# Patient Record
Sex: FEMALE | Race: WHITE | NOT HISPANIC OR LATINO | URBAN - METROPOLITAN AREA
[De-identification: names, ages, dates, MRNs, and addresses within clinical notes are randomized per-mention and may not be internally consistent; named-entity substitution may affect disease eponyms.]

---

## 2024-07-15 ENCOUNTER — APPOINTMENT (OUTPATIENT)
Dept: RADIOLOGY | Facility: CLINIC | Age: 39
End: 2024-07-15
Payer: COMMERCIAL

## 2024-07-15 ENCOUNTER — OFFICE VISIT (OUTPATIENT)
Dept: URGENT CARE | Facility: CLINIC | Age: 39
End: 2024-07-15
Payer: COMMERCIAL

## 2024-07-15 VITALS
TEMPERATURE: 99.6 F | HEART RATE: 108 BPM | WEIGHT: 173 LBS | OXYGEN SATURATION: 98 % | DIASTOLIC BLOOD PRESSURE: 62 MMHG | SYSTOLIC BLOOD PRESSURE: 101 MMHG | RESPIRATION RATE: 20 BRPM

## 2024-07-15 DIAGNOSIS — R05.1 ACUTE COUGH: ICD-10-CM

## 2024-07-15 DIAGNOSIS — J18.9 PNEUMONIA OF BOTH LOWER LOBES DUE TO INFECTIOUS ORGANISM: Primary | ICD-10-CM

## 2024-07-15 PROCEDURE — 71046 X-RAY EXAM CHEST 2 VIEWS: CPT

## 2024-07-15 PROCEDURE — 99213 OFFICE O/P EST LOW 20 MIN: CPT | Performed by: NURSE PRACTITIONER

## 2024-07-15 PROCEDURE — 87635 SARS-COV-2 COVID-19 AMP PRB: CPT | Performed by: NURSE PRACTITIONER

## 2024-07-15 RX ORDER — TOPIRAMATE 50 MG/1
50 TABLET, FILM COATED ORAL 2 TIMES DAILY
COMMUNITY
Start: 2024-01-23 | End: 2025-01-22

## 2024-07-15 RX ORDER — AMOXICILLIN AND CLAVULANATE POTASSIUM 875; 125 MG/1; MG/1
1 TABLET, FILM COATED ORAL EVERY 12 HOURS SCHEDULED
Qty: 20 TABLET | Refills: 0 | Status: SHIPPED | OUTPATIENT
Start: 2024-07-15 | End: 2024-07-25

## 2024-07-15 RX ORDER — ALBUTEROL SULFATE 90 UG/1
2 AEROSOL, METERED RESPIRATORY (INHALATION) EVERY 6 HOURS PRN
Qty: 8.5 G | Refills: 0 | Status: SHIPPED | OUTPATIENT
Start: 2024-07-15

## 2024-07-15 RX ORDER — PREDNISONE 10 MG/1
TABLET ORAL
Qty: 24 TABLET | Refills: 0 | Status: SHIPPED | OUTPATIENT
Start: 2024-07-15

## 2024-07-15 NOTE — RESULT ENCOUNTER NOTE
Spoke with reading radiologist who called and states that CXR is abnormal.  CXR is abnormal. Pt prescribed augmentin, prednisone and albuterol MDI.  Explained she needs 2 week follow up with PCP and is to call today and make the appointment.  Explained may need f/u CXR vs CT scan.  Strict ED instructions given to pt if symptoms worsen or not improved.  Pt verbalizes understanding of instructions.

## 2024-07-15 NOTE — PROGRESS NOTES
[facility]        NAME: Emerita Ross is a 38 y.o. female  : 1985    MRN: [id number]  DATE: July 15, 2024  TIME: 1:41 PM    Assessment and Plan   Pneumonia of both lower lobes due to infectious organism [J18.9]  1. Pneumonia of both lower lobes due to infectious organism  amoxicillin-clavulanate (AUGMENTIN) 875-125 mg per tablet    predniSONE 10 mg tablet    albuterol (ProAir HFA) 90 mcg/act inhaler      2. Acute cough  COVID Only- Office Collect    XR chest pa & lateral    amoxicillin-clavulanate (AUGMENTIN) 875-125 mg per tablet    predniSONE 10 mg tablet    albuterol (ProAir HFA) 90 mcg/act inhaler            Patient Instructions       Follow up with PCP in 3-5 days.  Proceed to  ER if symptoms worsen.    If tests have been performed at [facility], our office will contact you with results if changes need to be made to the care plan discussed with you at the visit.  You can review your full results on [facility].      You have been prescribed Augmentin antibiotic. Finish all the tablets even when you start to feel better.   You can take a probiotics at the same time with the antibiotics.    You appear to have covid/symptoms.  You have a covid test pending.  You are to download  [employer] for the results in 24-48 hours.   You will be notified if results are +.    You are to take vitamin C, D3,  plain robitussin for cough.  Do not take cough suppressants; you want to take an expectorant.  Sleep on your stomach.  Mask as long as you are ill, feverish or coughing.         See your PCP for follow up in 2-3 days.    Go to the ED if symptoms worsen or are severe.   You are to call your PCP if your results are + to discuss Paxlovid treatment            Chief Complaint     Chief Complaint   Patient presents with    Cough     X one week     Shortness of Breath     X several days     Vomiting     After coughing spells          History of Present Illness       This is a 38 year old female here with  cough, congestion, SOB, fever, body aches, fatigue, chills, vomiting, and sweating that started last Monday. She was at a party two days before symptoms started. Her son also got sick but symptoms resolved. She has not tested for covid. She only took motrin and nyquil with no relief. No other complaints. PMH reviewed.  Denies pregnancy.      Cough  Associated symptoms include shortness of breath. Pertinent negatives include no ear pain, sore throat or wheezing.   Shortness of Breath  Associated symptoms include coughing. Pertinent negatives include no sore throat or wheezing.   Vomiting   Associated symptoms include coughing.       Review of Systems   Review of Systems   Constitutional: Negative.    HENT:  Positive for congestion. Negative for ear discharge, ear pain, sinus pressure, sinus pain, sneezing and sore throat.    Eyes: Negative.    Respiratory:  Positive for cough and shortness of breath. Negative for chest tightness and wheezing.    Cardiovascular: Negative.    Gastrointestinal:  Positive for vomiting.   Endocrine: Negative.    Genitourinary: Negative.    Musculoskeletal: Negative.    Skin: Negative.    Allergic/Immunologic: Negative.    Neurological: Negative.    Hematological: Negative.    Psychiatric/Behavioral: Negative.           Current Medications       Current Outpatient Medications:     albuterol (ProAir HFA) 90 mcg/act inhaler, Inhale 2 puffs every 6 (six) hours as needed for wheezing or shortness of breath, Disp: 8.5 g, Rfl: 0    amoxicillin-clavulanate (AUGMENTIN) 875-125 mg per tablet, Take 1 tablet by mouth every 12 (twelve) hours for 10 days, Disp: 20 tablet, Rfl: 0    predniSONE 10 mg tablet, Take 5 tabs po x 2 days; 4 tabs po x 2 days; 3 tabs po x 1 day; 2 tabs po x 1 day. 1 tab po x 1 day., Disp: 24 tablet, Rfl: 0    topiramate (TOPAMAX) 50 MG tablet, Take 50 mg by mouth 2 (two) times a day, Disp: , Rfl:     Current Allergies     Allergies as of 07/15/2024    (No Known Allergies)             The following portions of the patient's history were reviewed and updated as appropriate: allergies, current medications, past family history, past medical history, past social history, past surgical history and problem list.     Past Medical History:   Diagnosis Date    Migraine        History reviewed. No pertinent surgical history.    History reviewed. No pertinent family history.      Medications have been verified.        Objective   /62   Pulse (!) 108   Temp 99.6 °F (37.6 °C)   Resp 20   Wt 78.5 kg (173 lb)   LMP 07/08/2024 (Approximate)   SpO2 98%   Patient's last menstrual period was 07/08/2024 (approximate).       Physical Exam     Physical Exam  Vitals and nursing note reviewed.   Constitutional:       General: She is not in acute distress.     Appearance: She is not ill-appearing, toxic-appearing or diaphoretic.      Interventions: She is not intubated.  HENT:      Head: Normocephalic and atraumatic.      Mouth/Throat:      Mouth: Mucous membranes are moist.      Pharynx: No pharyngeal swelling or oropharyngeal exudate.   Eyes:      Extraocular Movements: Extraocular movements intact.      Pupils: Pupils are equal, round, and reactive to light.   Cardiovascular:      Rate and Rhythm: Normal rate and regular rhythm. No extrasystoles are present.     Pulses: No decreased pulses.      Heart sounds: No murmur heard.     No friction rub. No gallop.   Pulmonary:      Effort: Pulmonary effort is normal. No tachypnea, bradypnea, accessory muscle usage or respiratory distress. She is not intubated.      Breath sounds: Decreased breath sounds present. No wheezing, rhonchi or rales.      Comments: Constant dry cough while in exam room   Chest:      Chest wall: No mass or tenderness.   Musculoskeletal:         General: Normal range of motion.      Cervical back: Normal range of motion and neck supple.   Skin:     General: Skin is warm and dry.      Capillary Refill: Capillary refill takes less than 2  seconds.   Neurological:      General: No focal deficit present.      Mental Status: She is alert.   Psychiatric:         Mood and Affect: Mood normal.         Behavior: Behavior normal.         Education provided on covid, treatment and risk factors.  Also educated on MDI use     Preliminary reading CXR  ? B/L pneumonia vs covid  Waiting on rad read

## 2024-07-15 NOTE — PATIENT INSTRUCTIONS
You have been prescribed Augmentin antibiotic. Finish all the tablets even when you start to feel better.   You can take a probiotics at the same time with the antibiotics.    You appear to have covid/symptoms.  You have a covid test pending.  You are to download [employer] for the results in 24-48 hours.   You will be notified if results are +.    You are to take vitamin C, D3,  plain robitussin for cough.  Do not take cough suppressants; you want to take an expectorant.  Sleep on your stomach.  Mask as long as you are ill, feverish or coughing.         See your PCP for follow up in 2-3 days.    Go to the ED if symptoms worsen or are severe.   You are to call your PCP if your results are + to discuss Paxlovid treatment

## 2024-07-15 NOTE — LETTER
[facility]  [address] [address]      July 18, 2024    MRN: [id number]     Phone: [phone]     Dear Ms. Ross,    You recently had a(n) Diagnostic Imaging performed on 7/15/2024 at  [facility] that was requested by BRYAN Lundberg. The study was reviewed by a radiologist, which is a physician who specializes in medical imaging. The radiologist issued a report describing his or her findings. In that report there was a finding that the radiologist felt warranted further discussion with your health care provider and that discussion would be beneficial to you.      The results were sent to BRYAN Lundberg on 07/15/2024  1:50 PM. We recommend that you contact BRYAN Lundberg at [phone] or set up an appointment to discuss the results of the imaging test. If you have already heard from BRYAN Lundberg regarding the results of your study, you can disregard this letter.     This letter is not meant to alarm you, but intended to encourage you to follow-up on your results with the provider that sent you for the imaging study. In addition, we have enclosed answers to frequently asked questions by other patients who have also received a letter to review results with their health care provider (see page two).      Thank you for choosing [facility] for your medical imaging needs.                                                                                                                                                        FREQUENTLY ASKED QUESTIONS    Why am I receiving this letter?  [place] State Law requires us to notify patients who have findings on imaging exams that may require more testing or follow-up with a health professional within the next 3 months.        How serious is the finding on the imaging test?  This letter is sent to all patients who may need  follow-up or more testing within the next 3 months.  Receiving this letter does not necessarily mean you have a life-threatening imaging finding or disease.  Recommendations in the radiologist’s imaging report are general in nature and it is up to your healthcare provider to say whether those recommendations make sense for your situation.  You are strongly encouraged to talk to your health care provider about the results and ask whether additional steps need to be taken.    Where can I get a copy of the final report for my recent radiology exam?  To get a full copy of the report you can access your records online at [email or web address] or please contact [facility]’s Medical Records Department at [phone] Monday through Friday between 8 am and 6 pm.         What do I need to do now?           Please contact your health care provider who requested the imaging study to discuss what further actions (if any) are needed.  You may have already heard from (your ordering provider) in regard to this test in which case you can disregard this letter.        NOTICE IN ACCORDANCE WITH THE [place] STATE “PATIENT TEST RESULT INFORMATION ACT OF 2018”    You are receiving this notice as a result of a determination by your diagnostic imaging service that further discussions of your test results are warranted and would be beneficial to you.    The complete results of your test or tests have been or will be sent to the health care practitioner that ordered the test or tests. It is recommended that you contact your health care practitioner to discuss your results as soon as possible.

## 2024-07-15 NOTE — LETTER
July 15, 2024     Patient: Emerita Ross   YOB: 1985   Date of Visit: 7/15/2024       To Whom It May Concern:    It is my medical opinion that Luis Ross may return to work on 7/16/2024.  He brought his wife to care now due to illness     If you have any questions or concerns, please don't hesitate to call.         Sincerely,        BRYAN Lundberg    CC: No Recipients

## 2024-07-16 LAB — SARS-COV-2 RNA RESP QL NAA+PROBE: NEGATIVE
